# Patient Record
(demographics unavailable — no encounter records)

---

## 2025-02-21 NOTE — PHYSICAL EXAM
[de-identified] : The patient appears well nourished  and in no apparent distress.  The patient is alert and oriented to person, place, and time.   Affect and mood appear normal.    The head is normocephalic and atraumatic.  The eyes reveal normal sclera and extra ocular muscles are intact.   The neck appears normal with no jugular venous distention or masses noted.   Skin shows normal turgor with no evidence of eczema or psoriasis.  No respiratory distress noted.  The patient ambulates with an antalgic gait.  The left knee has mild loss of range of motion.  Mild pain noted with terminal motion.  Tenderness about the medial and patellofemoral joints to palpation.  Trace effusion noted..   There is a negative Yassine sign.  There is no soft tissue swelling, warmth, or erythema.   There is a negative Lachman sign and negative anterior/posterior drawer.  Negative pivot shift test.  There is no instability to varus/valgus stress.    There is normal strength  in the quadriceps and hamstring muscles.  Strength and sensation are intact distally.  There are normal pulses distally and good capillary refill.  No edema or lymphadenopathy noted.    [de-identified] : AP, lateral, tunnel, and merchant views of the left knee were obtained.  There is moderate bordering on severe medial joint narrowing with osteophyte formation.  There is moderate bordering on severe narrowing of the patellofemoral joint with osteophyte formation..  No fractures or dislocations are noted.

## 2025-02-21 NOTE — HISTORY OF PRESENT ILLNESS
[de-identified] : This patient presents today for evaluation going complaints of left knee pain.  The left knee pain started on Daven[place]'s Day without injury.  Pain level 9-10 out of 10.  Patient notes pain with ambulation and stair climbing.  Pain is improved with rest.  She been taking tramadol for the pain.  She does have a history of metastatic breast cancer.  She presents today with continued pain about the left knee which has not responded to modification of activities and pain medicine.

## 2025-02-21 NOTE — DISCUSSION/SUMMARY
[de-identified] : This patient presents today for evaluation regarding complaints of left knee pain.  Physical exam and x-ray shows evidence of significant osteoarthritis about the left knee.  I discussed treatment option with the patient.  Do think the patient would respond favorably to a cortisone injection.  At today's visit we injected Depo-Medrol lidocaine to the left knee under sterile conditions.  Instructions are given postinjection for ice algesia to modification of activities.  I will see the patient back in office in 2 weeks to see how she is done with the injection.  We can consider viscosupplementation in future as well.  Patient understands that she will likely require knee replacement surgery sometime in the future.  At least 30 minutes was spent performing the evaluation and management on today's office visit.  This includes but is not limited to preparing to see patient including review of any test results or outside medical records, obtaining and/or reviewing separately obtained history, performing examination and evaluation, counseling and educating the patient on their diagnosis and treatment recommendations, ordering medications, tests, or procedures, documenting clinical information in the electronic health record, independently interpreting results (not separately reported) and communicating results to the patient, and coordination of care.

## 2025-02-21 NOTE — PHYSICAL EXAM
[de-identified] : The patient appears well nourished  and in no apparent distress.  The patient is alert and oriented to person, place, and time.   Affect and mood appear normal.    The head is normocephalic and atraumatic.  The eyes reveal normal sclera and extra ocular muscles are intact.   The neck appears normal with no jugular venous distention or masses noted.   Skin shows normal turgor with no evidence of eczema or psoriasis.  No respiratory distress noted.  The patient ambulates with an antalgic gait.  The left knee has mild loss of range of motion.  Mild pain noted with terminal motion.  Tenderness about the medial and patellofemoral joints to palpation.  Trace effusion noted..   There is a negative Yassine sign.  There is no soft tissue swelling, warmth, or erythema.   There is a negative Lachman sign and negative anterior/posterior drawer.  Negative pivot shift test.  There is no instability to varus/valgus stress.    There is normal strength  in the quadriceps and hamstring muscles.  Strength and sensation are intact distally.  There are normal pulses distally and good capillary refill.  No edema or lymphadenopathy noted.    [de-identified] : AP, lateral, tunnel, and merchant views of the left knee were obtained.  There is moderate bordering on severe medial joint narrowing with osteophyte formation.  There is moderate bordering on severe narrowing of the patellofemoral joint with osteophyte formation..  No fractures or dislocations are noted.

## 2025-02-21 NOTE — DISCUSSION/SUMMARY
[de-identified] : This patient presents today for evaluation regarding complaints of left knee pain.  Physical exam and x-ray shows evidence of significant osteoarthritis about the left knee.  I discussed treatment option with the patient.  Do think the patient would respond favorably to a cortisone injection.  At today's visit we injected Depo-Medrol lidocaine to the left knee under sterile conditions.  Instructions are given postinjection for ice algesia to modification of activities.  I will see the patient back in office in 2 weeks to see how she is done with the injection.  We can consider viscosupplementation in future as well.  Patient understands that she will likely require knee replacement surgery sometime in the future.  At least 30 minutes was spent performing the evaluation and management on today's office visit.  This includes but is not limited to preparing to see patient including review of any test results or outside medical records, obtaining and/or reviewing separately obtained history, performing examination and evaluation, counseling and educating the patient on their diagnosis and treatment recommendations, ordering medications, tests, or procedures, documenting clinical information in the electronic health record, independently interpreting results (not separately reported) and communicating results to the patient, and coordination of care.

## 2025-03-05 NOTE — PHYSICAL EXAM
[de-identified] : The patient appears well nourished  and in no apparent distress.  The patient is alert and oriented to person, place, and time.   Affect and mood appear normal.    The head is normocephalic and atraumatic.  The eyes reveal normal sclera and extra ocular muscles are intact.   The neck appears normal with no jugular venous distention or masses noted.   Skin shows normal turgor with no evidence of eczema or psoriasis.  No respiratory distress noted.  The patient ambulates with an antalgic gait.  The left knee has mild loss of range of motion.  Mild pain noted with terminal motion.  Tenderness about the medial and patellofemoral joints to palpation.  Trace effusion noted..   There is a negative Yassine sign.  There is no soft tissue swelling, warmth, or erythema.   There is a negative Lachman sign and negative anterior/posterior drawer.  Negative pivot shift test.  There is no instability to varus/valgus stress.    There is normal strength  in the quadriceps and hamstring muscles.  Strength and sensation are intact distally.  There are normal pulses distally and good capillary refill.  No edema or lymphadenopathy noted.

## 2025-03-05 NOTE — HISTORY OF PRESENT ILLNESS
[de-identified] : This patient presents today for follow-up regarding osteoarthritis about the left knee.  She did have a cortisone injection 2 weeks ago.  She is noting about 40% improvement in her symptoms.  Still complains of pain and stiffness about the knee with activity.  Pain level currently 4 out of 10.  Pain worse with activity and improved with rest.  She notes occasional buckling about the knee.  She denies radicular symptoms or numbness and tingling.  She presents today for further evaluation and treatment options.

## 2025-03-05 NOTE — DISCUSSION/SUMMARY
[de-identified] : The patient presents today for follow-up regarding osteoarthritis about the left knee.  She did have a cortisone injection 2 weeks ago which gave her about 40% improvement of symptoms.  She presented today with continued pain about the left knee with activity.  I discussed further treatment options.  Patient understands that she does have significant osteoarthritis and may require knee replacement surgery in the future should her symptoms deteriorate.  In the meantime I recommended a course of viscosupplementation.  On today's visit we injected the first dose of Euflexxa to the left knee under sterile conditions.  Instructions are given postinjection for ice analgesics and modification of activities peril see back in 1 week for the next injection.  At least 30 minutes was spent performing the evaluation and management on today's office visit.  This includes but is not limited to preparing to see patient including review of any test results or outside medical records, obtaining and/or reviewing separately obtained history, performing examination and evaluation, counseling and educating the patient on their diagnosis and treatment recommendations, ordering medications, tests, or procedures, documenting clinical information in the electronic health record, independently interpreting results (not separately reported) and communicating results to the patient, and coordination of care.

## 2025-03-05 NOTE — PROCEDURE
[de-identified] : A 22gauge needle was sterilely placed onto the syringe of Euflexxa.  The  left knee was then sterilely prepped with chlorhexidine and ethylene chloride spray was used as an anesthetic just prior to the injection.  Under ultrasound guidance utilizing the [place] Lumify ultrasound probe the Euflexxa was injected into left knee joint just above and lateral to the patella into the suprapatellar pouch.  Placement of the injection into the suprapatellar pouch was confirmed by ultrasound and a spot image was saved.  The patient tolerated the procedure well without difficulty.  The patient was given instructions on the use of ice and anti-inflammatories for post injection site soreness.

## 2025-03-05 NOTE — REASON FOR VISIT
[Follow-Up Visit] : a follow-up visit for [[email or web address]] : Osteoarthritis of left knee Initial (On Arrival)

## 2025-03-19 NOTE — HISTORY OF PRESENT ILLNESS
[de-identified] : This patient presents today for follow regarding osteoarthritis about the left knee.  She presents today for the third Euflexxa injection.  She defers 2 injections with relief.  She is noting some continued discomfort 5 out of 10.  He also notes swelling and buckling.  No adverse effects noted after last week's injection.

## 2025-03-19 NOTE — PHYSICAL EXAM
[de-identified] : The patient appears well nourished  and in no apparent distress.  The patient is alert and oriented to person, place, and time.   Affect and mood appear normal.    The head is normocephalic and atraumatic.  The eyes reveal normal sclera and extra ocular muscles are intact.   The neck appears normal with no jugular venous distention or masses noted.   Skin shows normal turgor with no evidence of eczema or psoriasis.  No respiratory distress noted.  The patient ambulates with an antalgic gait.  The left knee has mild loss of range of motion.  Mild pain noted with terminal motion.  Tenderness about the medial and patellofemoral joints to palpation.  Trace effusion noted..   There is a negative Yassine sign.  There is no soft tissue swelling, warmth, or erythema.   There is a negative Lachman sign and negative anterior/posterior drawer.  Negative pivot shift test.  There is no instability to varus/valgus stress.    There is normal strength  in the quadriceps and hamstring muscles.  Strength and sensation are intact distally.  There are normal pulses distally and good capillary refill.  No edema or lymphadenopathy noted.

## 2025-03-19 NOTE — DISCUSSION/SUMMARY
[de-identified] : On today's visit we injected the last dose of hyaluronic acid to the knee under sterile conditions.  Instructions were given to the patient regarding postinjection pain for ice, analgesics, and modification of activities.  I will see the patient back in the office if they have recurrence of symptoms.

## 2025-03-19 NOTE — PROCEDURE
[de-identified] : A 22gauge needle was sterilely placed onto the syringe of Euflexxa.  The  left knee was then sterilely prepped with chlorhexidine and ethylene chloride spray was used as an anesthetic just prior to the injection.  Under ultrasound guidance utilizing the [place] Lumify ultrasound probe the Euflexxa was injected into left knee joint just above and lateral to the patella into the suprapatellar pouch.  Placement of the injection into the suprapatellar pouch was confirmed by ultrasound and a spot image was saved.  The patient tolerated the procedure well without difficulty.  The patient was given instructions on the use of ice and anti-inflammatories for post injection site soreness.

## 2025-03-19 NOTE — PHYSICAL EXAM
[de-identified] : The patient appears well nourished  and in no apparent distress.  The patient is alert and oriented to person, place, and time.   Affect and mood appear normal.    The head is normocephalic and atraumatic.  The eyes reveal normal sclera and extra ocular muscles are intact.   The neck appears normal with no jugular venous distention or masses noted.   Skin shows normal turgor with no evidence of eczema or psoriasis.  No respiratory distress noted.  The patient ambulates with an antalgic gait.  The left knee has mild loss of range of motion.  Mild pain noted with terminal motion.  Tenderness about the medial and patellofemoral joints to palpation.  Trace effusion noted..   There is a negative Yassine sign.  There is no soft tissue swelling, warmth, or erythema.   There is a negative Lachman sign and negative anterior/posterior drawer.  Negative pivot shift test.  There is no instability to varus/valgus stress.    There is normal strength  in the quadriceps and hamstring muscles.  Strength and sensation are intact distally.  There are normal pulses distally and good capillary refill.  No edema or lymphadenopathy noted.

## 2025-03-19 NOTE — DISCUSSION/SUMMARY
[de-identified] : On todays visit we injected the  dose of hyaluronic acid without difficulty.  Instructions were given postinjection regarding ice, analgesics, and modification of activities.  I will see the patient back in one week for the next injection.

## 2025-03-19 NOTE — REASON FOR VISIT
[Follow-Up Visit] : a follow-up visit for [FreeTextEntry2] : osteoarthritis of left knee, Euflexxa # 2

## 2025-03-19 NOTE — PROCEDURE
[de-identified] : A 22gauge needle was sterilely placed onto the syringe of Euflexxa.  The  left knee was then sterilely prepped with chlorhexidine and ethylene chloride spray was used as an anesthetic just prior to the injection.  Under ultrasound guidance utilizing the [place] Lumify ultrasound probe the Euflexxa was injected into left knee joint just above and lateral to the patella into the suprapatellar pouch.  Placement of the injection into the suprapatellar pouch was confirmed by ultrasound and a spot image was saved.  The patient tolerated the procedure well without difficulty.  The patient was given instructions on the use of ice and anti-inflammatories for post injection site soreness.

## 2025-03-19 NOTE — HISTORY OF PRESENT ILLNESS
[de-identified] : This patient presents today for follow-up of her left knee osteoarthritis.  She presents today for the second Euflexxa injections in her knee.  She had the first injection last week with slight improvement.  Pain level 4-5 out of 10.  No adverse effects noted after the last injection.

## 2025-03-19 NOTE — PROCEDURE
[de-identified] : A 22gauge needle was sterilely placed onto the syringe of Euflexxa.  The  left knee was then sterilely prepped with chlorhexidine and ethylene chloride spray was used as an anesthetic just prior to the injection.  Under ultrasound guidance utilizing the [place] Lumify ultrasound probe the Euflexxa was injected into left knee joint just above and lateral to the patella into the suprapatellar pouch.  Placement of the injection into the suprapatellar pouch was confirmed by ultrasound and a spot image was saved.  The patient tolerated the procedure well without difficulty.  The patient was given instructions on the use of ice and anti-inflammatories for post injection site soreness.

## 2025-03-19 NOTE — PHYSICAL EXAM
[de-identified] : The patient appears well nourished  and in no apparent distress.  The patient is alert and oriented to person, place, and time.   Affect and mood appear normal.    The head is normocephalic and atraumatic.  The eyes reveal normal sclera and extra ocular muscles are intact.   The neck appears normal with no jugular venous distention or masses noted.   Skin shows normal turgor with no evidence of eczema or psoriasis.  No respiratory distress noted.  The patient ambulates with an antalgic gait.  The left knee has mild loss of range of motion.  Mild pain noted with terminal motion.  Tenderness about the medial and patellofemoral joints to palpation.  Trace effusion noted..   There is a negative Yassine sign.  There is no soft tissue swelling, warmth, or erythema.   There is a negative Lachman sign and negative anterior/posterior drawer.  Negative pivot shift test.  There is no instability to varus/valgus stress.    There is normal strength  in the quadriceps and hamstring muscles.  Strength and sensation are intact distally.  There are normal pulses distally and good capillary refill.  No edema or lymphadenopathy noted.

## 2025-03-19 NOTE — HISTORY OF PRESENT ILLNESS
[de-identified] : This patient presents today for follow-up of her left knee osteoarthritis.  She presents today for the second Euflexxa injections in her knee.  She had the first injection last week with slight improvement.  Pain level 4-5 out of 10.  No adverse effects noted after the last injection.

## 2025-03-19 NOTE — DISCUSSION/SUMMARY
[de-identified] : On todays visit we injected the  dose of hyaluronic acid without difficulty.  Instructions were given postinjection regarding ice, analgesics, and modification of activities.  I will see the patient back in one week for the next injection.